# Patient Record
Sex: MALE | Race: BLACK OR AFRICAN AMERICAN | NOT HISPANIC OR LATINO | URBAN - METROPOLITAN AREA
[De-identification: names, ages, dates, MRNs, and addresses within clinical notes are randomized per-mention and may not be internally consistent; named-entity substitution may affect disease eponyms.]

---

## 2017-04-11 ENCOUNTER — OUTPATIENT (OUTPATIENT)
Dept: OUTPATIENT SERVICES | Facility: HOSPITAL | Age: 47
LOS: 1 days | End: 2017-04-11

## 2017-04-11 DIAGNOSIS — Z00.00 ENCOUNTER FOR GENERAL ADULT MEDICAL EXAMINATION WITHOUT ABNORMAL FINDINGS: ICD-10-CM

## 2019-11-07 ENCOUNTER — EMERGENCY (EMERGENCY)
Facility: HOSPITAL | Age: 49
LOS: 1 days | Discharge: HOME | End: 2019-11-07
Admitting: EMERGENCY MEDICINE
Payer: COMMERCIAL

## 2019-11-07 VITALS
HEART RATE: 78 BPM | TEMPERATURE: 98 F | HEIGHT: 72 IN | SYSTOLIC BLOOD PRESSURE: 171 MMHG | DIASTOLIC BLOOD PRESSURE: 98 MMHG | OXYGEN SATURATION: 96 % | WEIGHT: 195.11 LBS | RESPIRATION RATE: 16 BRPM
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
## 2019-11-07 DIAGNOSIS — S61.235A PUNCTURE WOUND WITHOUT FOREIGN BODY OF LEFT RING FINGER WITHOUT DAMAGE TO NAIL, INITIAL ENCOUNTER: ICD-10-CM

## 2019-11-07 DIAGNOSIS — S61.215A LACERATION WITHOUT FOREIGN BODY OF LEFT RING FINGER WITHOUT DAMAGE TO NAIL, INITIAL ENCOUNTER: ICD-10-CM
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
## 2019-11-07 PROCEDURE — 99285 EMERGENCY DEPT VISIT HI MDM: CPT | Mod: 25

## 2019-11-07 PROCEDURE — 12001 RPR S/N/AX/GEN/TRNK 2.5CM/<: CPT

## 2019-11-07 PROCEDURE — 99284 EMERGENCY DEPT VISIT MOD MDM: CPT

## 2019-11-07 PROCEDURE — 73130 X-RAY EXAM OF HAND: CPT

## 2019-11-07 PROCEDURE — 73130 X-RAY EXAM OF HAND: CPT | Mod: 26,LT

## 2019-11-07 RX ORDER — CEPHALEXIN 500 MG
1 CAPSULE ORAL
Qty: 9 | Refills: 0
Start: 2019-11-07 | End: 2019-11-09

## 2019-11-07 NOTE — ED PROVIDER NOTE - PATIENT PORTAL LINK FT
You can access the FollowMyHealth Patient Portal offered by [facility] by registering at the following website: [email or web address]. By joining [employer]’s FollowMyHealth portal, you will also be able to view your health information using other applications (apps) compatible with our system.

## 2019-11-07 NOTE — ED PROVIDER NOTE - NSFOLLOWUPINSTRUCTIONS_ED_ALL_ED_FT
please keep the finger clean    please go to [employer] for a work note    please take antibiotics as prescribed    please follow up with Dr. Navarrete on Monday    A laceration is a cut that goes through all of the layers of the skin and into the tissue that is right under the skin. Some lacerations heal on their own. Others need to be closed with skin adhesive strips, skin glue, stitches (sutures), or staples. Proper laceration care minimizes the risk of infection and helps the laceration to heal better.  If non-absorbable stitches or staples have been placed, they must be taken out within the time frame instructed by your healthcare provider.    SEEK IMMEDIATE MEDICAL CARE IF YOU HAVE ANY OF THE FOLLOWING SYMPTOMS: swelling around the wound, worsening pain, drainage from the wound, red streaking going away from your wound, inability to move finger or toe near the laceration, or discoloration of skin near the laceration.

## 2019-11-07 NOTE — ED PROVIDER NOTE - CLINICAL SUMMARY MEDICAL DECISION MAKING FREE TEXT BOX
Deepali 50 y/o M presenting to the ED with puncture wound on the distal L 4th phalanx. Repaired by Dr. Navarrete (plastic surgeon), plan to give Keflex TID x 3 days and have patient followup Monday. Patient agreeable. Deepali 50 y/o M presenting to the ED with puncture wound on the distal L 4th phalanx. Repaired by Dr. Navarrete (plastic surgeon), tetanus UTD. plan to give Keflex TID x 3 days and have patient followup Monday. Patient agreeable.

## 2019-11-07 NOTE — ED ADULT NURSE NOTE - NSIMPLEMENTINTERV_GEN_ALL_ED
Implemented All Universal Safety Interventions:  [place] to call system. Call bell, personal items and telephone within reach. Instruct patient to call for assistance. Room bathroom lighting operational. Non-slip footwear when patient is off stretcher. Physically safe environment: no spills, clutter or unnecessary equipment. Stretcher in lowest position, wheels locked, appropriate side rails in place.

## 2019-11-07 NOTE — ED PROVIDER NOTE - OBJECTIVE STATEMENT
50 y/o M with no PMHx, works at a hospital, presents with puncture injury after drill gun that he was using slipped and went into the tip of his L ring finger, no numbness, tingling, weakness, pain with ROM. Patient concerned that she needs stitches. States Tetanus UTD.

## 2019-11-07 NOTE — ED PROVIDER NOTE - PHYSICAL EXAMINATION
General survey: Patient is well developed and well nourished. Patient is lying in stretcher, not diaphoretic and does not appear in acute distress.    Skin: puncture wound left distal phalanx of the 4th digit. Warm dry and intact. No note of any edema, pallor, jaundice, erythema, ecchymosis, or purpura    Psych: Mood and affect appropriate

## 2020-02-03 PROBLEM — Z78.9 OTHER SPECIFIED HEALTH STATUS: Chronic | Status: ACTIVE | Noted: 2019-11-07

## 2020-02-24 ENCOUNTER — APPOINTMENT (OUTPATIENT)
Dept: PULMONOLOGY | Facility: CLINIC | Age: 50
End: 2020-02-24
Payer: COMMERCIAL

## 2020-02-24 VITALS
WEIGHT: 203 LBS | TEMPERATURE: 98.4 F | RESPIRATION RATE: 12 BRPM | OXYGEN SATURATION: 98 % | SYSTOLIC BLOOD PRESSURE: 120 MMHG | HEART RATE: 75 BPM | HEIGHT: 68 IN | BODY MASS INDEX: 30.77 KG/M2 | DIASTOLIC BLOOD PRESSURE: 90 MMHG

## 2020-02-24 DIAGNOSIS — Z78.9 OTHER SPECIFIED HEALTH STATUS: ICD-10-CM

## 2020-02-24 DIAGNOSIS — R05 COUGH: ICD-10-CM

## 2020-02-24 PROBLEM — Z00.00 ENCOUNTER FOR PREVENTIVE HEALTH EXAMINATION: Status: ACTIVE | Noted: 2020-02-24

## 2020-02-24 PROCEDURE — 99204 OFFICE O/P NEW MOD 45 MIN: CPT

## 2020-02-24 NOTE — ASSESSMENT
[FreeTextEntry1] : 49M with a postviral protracted cough, now back to normal baseline. No clinical evidence of lung disease. Reported CXR clear. No further workup or followup indicated.

## 2020-02-24 NOTE — HISTORY OF PRESENT ILLNESS
[TextBox_4] : 49M here for evaluation of URI and cough. Pt reports last month he has a URI eith a protracted dry cough and was seen by PMD who tested him for flu, which was negative. Pt made this appointment at that time but since his cough has resolved and he is feeling back to his normal baseline. nO h/o asthma, no james or sob.

## 2020-04-26 ENCOUNTER — MESSAGE (OUTPATIENT)
Age: 50
End: 2020-04-26

## 2020-10-01 ENCOUNTER — EMERGENCY (EMERGENCY)
Facility: HOSPITAL | Age: 50
LOS: 1 days | Discharge: HOME | End: 2020-10-01
Attending: EMERGENCY MEDICINE | Admitting: EMERGENCY MEDICINE
Payer: COMMERCIAL

## 2020-10-01 VITALS
HEART RATE: 78 BPM | SYSTOLIC BLOOD PRESSURE: 153 MMHG | DIASTOLIC BLOOD PRESSURE: 106 MMHG | OXYGEN SATURATION: 99 % | TEMPERATURE: 98 F | RESPIRATION RATE: 18 BRPM

## 2020-10-01 VITALS
TEMPERATURE: 98 F | HEART RATE: 72 BPM | HEIGHT: 72 IN | SYSTOLIC BLOOD PRESSURE: 170 MMHG | OXYGEN SATURATION: 98 % | RESPIRATION RATE: 16 BRPM | DIASTOLIC BLOOD PRESSURE: 109 MMHG | WEIGHT: 195.11 LBS
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
## 2020-10-01 DIAGNOSIS — S61.231A PUNCTURE WOUND WITHOUT FOREIGN BODY OF LEFT INDEX FINGER WITHOUT DAMAGE TO NAIL, INITIAL ENCOUNTER: ICD-10-CM
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
## 2020-10-01 DIAGNOSIS — S61.213A LACERATION WITHOUT FOREIGN BODY OF LEFT MIDDLE FINGER WITHOUT DAMAGE TO NAIL, INITIAL ENCOUNTER: ICD-10-CM

## 2020-10-01 PROCEDURE — 99282 EMERGENCY DEPT VISIT SF MDM: CPT

## 2020-10-01 PROCEDURE — 99283 EMERGENCY DEPT VISIT LOW MDM: CPT

## 2020-10-01 NOTE — ED PROVIDER NOTE - PHYSICAL EXAMINATION
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

## 2020-10-01 NOTE — ED PROVIDER NOTE - PATIENT PORTAL LINK FT
You can access the FollowMyHealth Patient Portal offered by [facility] by registering at the following website: [email or web address]. By joining [employer]’s FollowMyHealth portal, you will also be able to view your health information using other applications (apps) compatible with our system.

## 2020-10-01 NOTE — ED ADULT TRIAGE NOTE - CHIEF COMPLAINT QUOTE
Pt c/o laceration to left 3rd s/p cutting on aluminum door while at work. Tetanus UTD. Denies numbness or tingling. No deformity noted.

## 2020-10-01 NOTE — ED ADULT NURSE NOTE - OBJECTIVE STATEMENT
Pt came to ED complaining of superficial skin tear to left middle finger.  Bleeding control by patient. pt reports he cut his finger while cutting aluminum at work. Pt presents with small superficial skin tear on left middle finger.  ISAURA Bains at bedside. Pt denies all other medical complaints at this time. Pt denies numbness, tingling.  Alert and oriented x3.

## 2020-10-01 NOTE — ED PROVIDER NOTE - CLINICAL SUMMARY MEDICAL DECISION MAKING FREE TEXT BOX
50 healthy M, [facility]  p/w injury to L 3rd digit while installing aluminum door today.  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  finger cleaned off, no need for any repair of skin tear and no indiction for imaging.  tetanus UTD, bandaid applied and educated on keeping wound clean/dry.  discussed strict return parameters.

## 2020-10-01 NOTE — ED PROVIDER NOTE - OBJECTIVE STATEMENT
50 healthy M, [facility]  p/w injury to L 3rd digit while installing aluminum door today.  pt reports he cut his L middle finger on lock mechanism and it immediately started bleeding.  Applied bandage and noted persistent bleeding so his boss wanted him to get check out.  Tetanus UTD.  Denies numbness/weakness, tingling, limited ROM finger, use of AC, dizziness, fainting, chest pain, sob, other injuries

## 2020-10-01 NOTE — ED ADULT NURSE NOTE - NSSUHOSCREENINGYN_ED_ALL_ED
Left message for Hien that Rachid needs to be seen about his sore throat, either with Dr. Gambino or walk in care. Advised Hien to call the office at [phone] to schedule an appointment if she wishes..      Yes - the patient is able to be screened

## 2020-10-01 NOTE — ED PROVIDER NOTE - NSFOLLOWUPINSTRUCTIONS_ED_ALL_ED_FT
Keep wound clean and dry to prevent infection.  Return to ED for any fever, discharge from wound, swelling or redness of finger, difficulty moving finger, or other concerns      Skin Tear    AMBULATORY CARE:    A skin tear occurs when the layers of weakened skin split open from an injury. It is important to treat and prevent skin tears to prevent infection.    Call your doctor if:   •You have a fever or chills.      •Blood soaks through your bandage.      •You have redness, swelling, pus, or a bad odor coming from your wound.      •You have severe pain.      •Your wound tears open again.      •You have questions or concerns about your condition or care.      Treatment may include any of the following:   •Medicines may be given to decrease pain or treat a bacterial infection.      •Bandages such as moist gauze pads or wraps may be placed on your wound. Bandages will help protect your wound from more injury, and allow your wound to heal. Do not use adhesive bandages. These could stick to your wound and make your skin tear worse.      •Stitches or medical glue may be used to close the wound so it can heal.      •Debridement is removal of dead, damaged, or infected skin to help your wound heal correctly.      Prevent a skin tear:   •Clean, moisturize, and protect your skin. Baths, hot showers, and soap can dry your skin and increase your risk for skin tears. Take lukewarm showers, use mild soap as directed, and gently pat your skin dry. Use lotion to keep your skin moist after you shower. Wear long sleeves, pants, and protective footwear.      •Move carefully. Ask for help if you cannot lift yourself. Do not drag your skin when you move.      •Keep your home safe. Cover sharp corners, keep your pathways clear, and turn on lights so you can see clearly. Ask for more information if you have questions about home safety.      •Drink liquids as directed. Ask your provider how much liquid to drink each day and which liquids are best for you. Liquids will help keep your skin moist and protected from another skin tear.      •Eat high-protein foods to help with wound healing. Examples are lean meats, fish, low-fat dairy products, and beans.       Follow up with your healthcare provider as directed: Write down your questions so you remember to ask them during your visits.

## 2021-06-25 NOTE — ED ADULT TRIAGE NOTE - BSA (M2)

## 2023-01-07 ENCOUNTER — EMERGENCY (EMERGENCY)
Facility: HOSPITAL | Age: 53
LOS: 1 days | Discharge: HOME | End: 2023-01-07
Attending: STUDENT IN AN ORGANIZED HEALTH CARE EDUCATION/TRAINING PROGRAM | Admitting: STUDENT IN AN ORGANIZED HEALTH CARE EDUCATION/TRAINING PROGRAM
Payer: COMMERCIAL

## 2023-01-07 VITALS
SYSTOLIC BLOOD PRESSURE: 138 MMHG | TEMPERATURE: 99 F | OXYGEN SATURATION: 98 % | RESPIRATION RATE: 17 BRPM | HEART RATE: 58 BPM | DIASTOLIC BLOOD PRESSURE: 81 MMHG

## 2023-01-07 VITALS
RESPIRATION RATE: 17 BRPM | SYSTOLIC BLOOD PRESSURE: 167 MMHG | WEIGHT: 203.93 LBS | DIASTOLIC BLOOD PRESSURE: 97 MMHG | HEIGHT: 67 IN | OXYGEN SATURATION: 98 % | TEMPERATURE: 99 F | HEART RATE: 64 BPM

## 2023-01-07 LAB
ALBUMIN SERPL ELPH-MCNC: 4.6 G/DL — SIGNIFICANT CHANGE UP (ref 3.3–5)
ALP SERPL-CCNC: 98 U/L — SIGNIFICANT CHANGE UP (ref 40–120)
ALT FLD-CCNC: 17 U/L — SIGNIFICANT CHANGE UP (ref 10–45)
ANION GAP SERPL CALC-SCNC: 13 MMOL/L — SIGNIFICANT CHANGE UP (ref 5–17)
AST SERPL-CCNC: 22 U/L — SIGNIFICANT CHANGE UP (ref 10–40)
BASOPHILS # BLD AUTO: 0.02 K/UL — SIGNIFICANT CHANGE UP (ref 0–0.2)
BASOPHILS NFR BLD AUTO: 0.3 % — SIGNIFICANT CHANGE UP (ref 0–2)
BILIRUB SERPL-MCNC: 0.4 MG/DL — SIGNIFICANT CHANGE UP (ref 0.2–1.2)
BUN SERPL-MCNC: 15 MG/DL — SIGNIFICANT CHANGE UP (ref 7–23)
CALCIUM SERPL-MCNC: 9.8 MG/DL — SIGNIFICANT CHANGE UP (ref 8.4–10.5)
CHLORIDE SERPL-SCNC: 100 MMOL/L — SIGNIFICANT CHANGE UP (ref 96–108)
CO2 SERPL-SCNC: 23 MMOL/L — SIGNIFICANT CHANGE UP (ref 22–31)
CREAT SERPL-MCNC: 0.89 MG/DL — SIGNIFICANT CHANGE UP (ref 0.5–1.3)
EGFR: 104 ML/MIN/1.73M2 — SIGNIFICANT CHANGE UP
EOSINOPHIL # BLD AUTO: 0.11 K/UL — SIGNIFICANT CHANGE UP (ref 0–0.5)
EOSINOPHIL NFR BLD AUTO: 1.5 % — SIGNIFICANT CHANGE UP (ref 0–6)
GLUCOSE SERPL-MCNC: 100 MG/DL — HIGH (ref 70–99)
HCT VFR BLD CALC: 40.2 % — SIGNIFICANT CHANGE UP (ref 39–50)
HGB BLD-MCNC: 12.9 G/DL — LOW (ref 13–17)
IMM GRANULOCYTES NFR BLD AUTO: 0.3 % — SIGNIFICANT CHANGE UP (ref 0–0.9)
LYMPHOCYTES # BLD AUTO: 1.77 K/UL — SIGNIFICANT CHANGE UP (ref 1–3.3)
LYMPHOCYTES # BLD AUTO: 23.8 % — SIGNIFICANT CHANGE UP (ref 13–44)
MCHC RBC-ENTMCNC: 31.5 PG — SIGNIFICANT CHANGE UP (ref 27–34)
MCHC RBC-ENTMCNC: 32.1 GM/DL — SIGNIFICANT CHANGE UP (ref 32–36)
MCV RBC AUTO: 98 FL — SIGNIFICANT CHANGE UP (ref 80–100)
MONOCYTES # BLD AUTO: 0.64 K/UL — SIGNIFICANT CHANGE UP (ref 0–0.9)
MONOCYTES NFR BLD AUTO: 8.6 % — SIGNIFICANT CHANGE UP (ref 2–14)
NEUTROPHILS # BLD AUTO: 4.88 K/UL — SIGNIFICANT CHANGE UP (ref 1.8–7.4)
NEUTROPHILS NFR BLD AUTO: 65.5 % — SIGNIFICANT CHANGE UP (ref 43–77)
NRBC # BLD: 0 /100 WBCS — SIGNIFICANT CHANGE UP (ref 0–0)
PLATELET # BLD AUTO: 290 K/UL — SIGNIFICANT CHANGE UP (ref 150–400)
POTASSIUM SERPL-MCNC: 4.4 MMOL/L — SIGNIFICANT CHANGE UP (ref 3.5–5.3)
POTASSIUM SERPL-SCNC: 4.4 MMOL/L — SIGNIFICANT CHANGE UP (ref 3.5–5.3)
PROT SERPL-MCNC: 7.8 G/DL — SIGNIFICANT CHANGE UP (ref 6–8.3)
RBC # BLD: 4.1 M/UL — LOW (ref 4.2–5.8)
RBC # FLD: 11.9 % — SIGNIFICANT CHANGE UP (ref 10.3–14.5)
SODIUM SERPL-SCNC: 136 MMOL/L — SIGNIFICANT CHANGE UP (ref 135–145)
WBC # BLD: 7.44 K/UL — SIGNIFICANT CHANGE UP (ref 3.8–10.5)
WBC # FLD AUTO: 7.44 K/UL — SIGNIFICANT CHANGE UP (ref 3.8–10.5)

## 2023-01-07 PROCEDURE — 85025 COMPLETE CBC W/AUTO DIFF WBC: CPT

## 2023-01-07 PROCEDURE — 96375 TX/PRO/DX INJ NEW DRUG ADDON: CPT

## 2023-01-07 PROCEDURE — 36415 COLL VENOUS BLD VENIPUNCTURE: CPT

## 2023-01-07 PROCEDURE — 80053 COMPREHEN METABOLIC PANEL: CPT

## 2023-01-07 PROCEDURE — 99284 EMERGENCY DEPT VISIT MOD MDM: CPT | Mod: 25

## 2023-01-07 PROCEDURE — 99284 EMERGENCY DEPT VISIT MOD MDM: CPT

## 2023-01-07 PROCEDURE — 96374 THER/PROPH/DIAG INJ IV PUSH: CPT

## 2023-01-07 RX ORDER — ACETAMINOPHEN 500 MG
1000 TABLET ORAL ONCE
Refills: 0 | Status: COMPLETED | OUTPATIENT
Start: 2023-01-07 | End: 2023-01-07

## 2023-01-07 RX ORDER — METOCLOPRAMIDE HCL 10 MG
10 TABLET ORAL ONCE
Refills: 0 | Status: COMPLETED | OUTPATIENT
Start: 2023-01-07 | End: 2023-01-07

## 2023-01-07 RX ADMIN — Medication 400 MILLIGRAM(S): at 11:40

## 2023-01-07 RX ADMIN — Medication 104 MILLIGRAM(S): at 11:40

## 2023-01-07 NOTE — ED PROVIDER NOTE - NSFOLLOWUPINSTRUCTIONS_ED_ALL_ED_FT
please follow up with your pmd, eat a low salt diet, limit caffeine and alcohol intake, you can take tylenol (1gr every 6 hrs) or ibuprofen (600mg every 8hrs) as needed for pain  General Headache  WHAT YOU NEED TO KNOW:  Headache pain may be mild or severe. Common causes include stress, medicines, and head injuries. Sleep problems, allergies, and hormone changes can also cause a headache. You may have frequent headaches that have no clear cause. Pain may start in another part of your body and move to your head. Headache pain can also move to other parts of your body. A headache can cause other symptoms, such as nausea and vomiting. A severe headache may be a sign of a stroke or other serious problem that needs immediate treatment.  DISCHARGE INSTRUCTIONS:  Have someone call your local emergency number (911 in the [place]) for any of the following:   •You have any of the following signs of a stroke: ?Numbness or drooping on one side of your face   ?Weakness in an arm or leg  ?Confusion or difficulty speaking  ?Dizziness, a severe headache, or vision loss  BE FAST SIGNS OF A STROKE  Return to the emergency department if:   •You have a headache with neck stiffness and a fever.  •You have a constant headache and are vomiting.  •You have severe pain that does not get better after you take pain medicine.  •You have a headache and the pain worsens when you look into light.  •You have a headache and vision changes, such as blurred vision.  •You have a headache and are forgetful or confused.  Call your doctor if:   •You have a headache each day that does not get better, even after treatment.  •You have changes in your headaches, or new symptoms that occur when you have a headache.  •Others you live or work with also have headaches.  •You have questions or concerns about your condition or care.  Medicines: You may need any of the following:   •Medicines may be given to prevent or treat headache pain. Do not wait until the pain is severe to take your medicine. Ask your healthcare provider how to take the medicine safely.  •NSAIDs, such as ibuprofen, help decrease swelling, pain, and fever. This medicine is available with or without a doctor's order. NSAIDs can cause stomach bleeding or kidney problems in certain people. If you take blood thinner medicine, always ask if NSAIDs are safe for you. Always read the medicine label and follow directions. Do not give these medicines to children younger than 6 months without direction from a healthcare provider.  •Acetaminophen decreases pain and fever. It is available without a doctor's order. Ask how much to take and how often to take it. Follow directions. Read the labels of all other medicines you are using to see if they also contain acetaminophen, or ask your doctor or pharmacist. Acetaminophen can cause liver damage if not taken correctly.  •Antinausea medicine may be given to calm your stomach and help prevent vomiting.  •Take your medicine as directed. Contact your healthcare provider if you think your medicine is not helping or if you have side effects. Tell your provider if you are allergic to any medicine. Keep a list of the medicines, vitamins, and herbs you take. Include the amounts, and when and why you take them. Bring the list or the pill bottles to follow-up visits. Carry your medicine list with you in case of an emergency.  Manage your symptoms:   •Rest in a dark and quiet room. This may help decrease your pain.  •Apply heat or ice as directed. Heat or ice may help decrease pain or muscle spasms. Apply heat or ice on the area for 20 minutes every 2 hours for as many days as directed. Your healthcare provider may recommend that you alternate heat and ice  •Relax your muscles to help relieve a headache. Lie down in a comfortable position and close your eyes. Relax your muscles slowly. Start at your toes and work your way up your body. A massage or warm bath may also help relax your muscles.  Keep a headache record: Record the dates and times that you get headaches, and what you were doing before the headache started. Also record what you ate and drank in the 24 hours before the headache started. This might help your healthcare provider find the cause of your headaches and make a treatment plan. The record can also help you avoid headache triggers or manage your symptoms.  Get enough sleep: You should get 8 to 10 hours of sleep each night. Create a sleep schedule. Go to bed and wake up at the same times each day. It may be helpful to do something relaxing before bed. Do not watch television right before bed.  Do not smoke: Nicotine and other chemicals in cigarettes and cigars can trigger a headache or make it worse. Ask your healthcare provider for information if you currently smoke and need help to quit. E-cigarettes or smokeless tobacco still contain nicotine. Talk to your healthcare provider before you use these products.  Drink liquids as directed: You may need to drink more liquid to prevent dehydration. Dehydration can cause a headache. Ask your healthcare provider how much liquid to drink each day and which liquids are best for you.  Limit caffeine and alcohol as directed: Your headaches may be triggered by caffeine or alcohol. You may also develop a headache if you drink caffeine regularly and suddenly stop.  Eat a variety of healthy foods: Do not skip meals. Too little food can trigger a headache. Include fruits, vegetables, whole-grain breads, low-fat dairy products, beans, lean meat, and fish. Do not have trigger foods, such as chocolate and red wine. Foods that contain gluten, nitrates, MSG, or artificial sweeteners may also trigger a headache.  Healthy Foods

## 2023-01-07 NOTE — ED PROVIDER NOTE - NS ED ATTENDING STATEMENT MOD
IRREGULAR This was a shared visit with the RON. I reviewed and verified the documentation and independently performed the documented:

## 2023-01-07 NOTE — ED ADULT TRIAGE NOTE - CHIEF COMPLAINT QUOTE
"I have a headache since yesterday and I took 1200mg of ibuprofen today but my headache is not going away" Patient denies confirmed hypertension hx but states he was told to watch his blood pressure. BEFAST negative, no dizziness, weakness or vision changes.

## 2023-01-07 NOTE — ED PROVIDER NOTE - OBJECTIVE STATEMENT
The pt is a 51 y/o M, who presents to ED c/o R sided h/a x 2 d. Pt states hx of htn but non compliant w/meds. Pain is 4/10, constant and throbbing, took ibuprofen w/o much relief. Denies cp, sob, palpitations, visual changes, hearing change, n/v/d, abd pain, dizziness, syncope, fevers, chills.

## 2023-01-07 NOTE — ED ADULT NURSE NOTE - OBJECTIVE STATEMENT
53 y/o male c/o right sided headache since yesterday. Pt reports hx of HTN and that he was started on a "low dose medication" however "I never took it" Pt denies any blurry vision, dizziness, nausea or any other symptoms.

## 2023-01-07 NOTE — ED ADULT TRIAGE NOTE - WEIGHT IN KG
Patient's first and last name, , procedure, and correct site confirmed prior to the start of procedure.
92.5

## 2023-01-07 NOTE — ED ADULT NURSE NOTE - SCORE
05/21/19 1315   C-SSRS (Frequent Screen)   2. Have you actually had any thoughts of killing yourself? No   6. Have you done anything, started to do anything, or prepared to do anything to end your life? No     Nursing Suicide Assessment Note - Inpatient    Current assessment:    Current C-SSRS score: (P) Negative screen= no ideation, behaviors or history      Protective Factors / Reason for Living: Social supports    Interventions:  Safety maintained, continue to monitor per protocol.    Other Interventions Implemented:  · Visual inspection of patient's environment completed. Items removed: none   1

## 2023-01-07 NOTE — ED PROVIDER NOTE - CLINICAL SUMMARY MEDICAL DECISION MAKING FREE TEXT BOX
pt c/o r sided h/a, not worst ha of life, not thunder clap, took ibuprofen w/min relief, hx of htn but non compliant w/meds, pt hypertensive in triage but bp normalized w/o intervention, well appearing, non toxic, non focal neuro exam, no concern for cva or hypertensive urgency or emergency, labs wnl, ha fully resolved w/tyl, no benefit in getting ct and radiating pt - will not alter tx plan, pt understands and agrees w/plan, strict return precautions given

## 2023-01-07 NOTE — ED PROVIDER NOTE - PATIENT PORTAL LINK FT
You can access the FollowMyHealth Patient Portal offered by [facility] by registering at the following website: [email or web address]. By joining [employer]’s FollowMyHealth portal, you will also be able to view your health information using other applications (apps) compatible with our system.

## 2023-01-09 DIAGNOSIS — R51.9 HEADACHE, UNSPECIFIED: ICD-10-CM

## 2023-01-09 DIAGNOSIS — I10 ESSENTIAL (PRIMARY) HYPERTENSION: ICD-10-CM

## 2024-03-27 NOTE — ED PROVIDER NOTE - CROS ED ROS STATEMENT
Skilled Needs: Education and Wound Care   Caregiver involvement: Pt dependent with care with the help of family due to DX   Medications reviewed and all medications are available in the home this visit.    The following education was provided regarding medications:  DIDI  MD notified of any discrepancies/look a-like medications/medication interactions: NA  Medications are effecrtive at this time.    Home health supplies by type and quantity ordered/delivered this visit include:  Supplies available   Patient education provided this visit:  Reviewed to reported any redness, swelling, warmth, fever, chills, increased heart/respiratory rate, uncontrolled pain/tenderness, drainage:green/yellow/brown, or odor to MD immediately. Wound care completed as ordered, Supplies avalable at this time. Robert debbie was able to  new ointment for wound care.   Sharps education provided: Clinician instructed patient/CG on proper disposal of sharps: Containers should be made of hard plastic, be puncture-resistant and leakproof, such as a laundry detergent or bleach bottle.  When the container is ¾ full, it should be sealed with tape and labeled DO NOT RECYCLE prior to discarding in the regular trash.    Patient level of understanding of education provided: Verbalzied all understanding to above education  Skilled Care Performed this visit: Education and Wound Care  Patient response to procedure performed: Minimal pain during dressing change   Agency Progress toward goals: Progressing toward interventions above  Patient's Progress towards personal goals: when patient reaches goals and medication is managed, and disease processes are understood patient agrees and understand that discharge will take place
all other ROS negative except as per HPI